# Patient Record
Sex: FEMALE | Race: OTHER | NOT HISPANIC OR LATINO | URBAN - METROPOLITAN AREA
[De-identification: names, ages, dates, MRNs, and addresses within clinical notes are randomized per-mention and may not be internally consistent; named-entity substitution may affect disease eponyms.]

---

## 2018-11-08 VITALS
HEART RATE: 74 BPM | WEIGHT: 128.09 LBS | OXYGEN SATURATION: 100 % | SYSTOLIC BLOOD PRESSURE: 145 MMHG | TEMPERATURE: 98 F | HEIGHT: 60 IN | DIASTOLIC BLOOD PRESSURE: 86 MMHG

## 2018-11-08 NOTE — H&P ADULT - HISTORY OF PRESENT ILLNESS
*Confirm Medications on Arrival    74 y/o F Current Smoker w/ PMHX HTN, HLD, COPD (per MD note, pt denies), Cardiomyopathy per MD note, who per Dr. Wylie’s note has increasing LE claudication, increasing leg pain/heaviness, claudication noted and B/L pain/discomfort. R > L, at times endorsing rest pain. She has to stop after 1 block. Her legs get heavy and tired. [place] class 4 symptoms. S/p abnormal LE arterial Doppler per MD note dated 10/8/18 revealing distal SFA, popliteal, and tibial monophasic, possible occlusion. Pt also reports LE ankle edema. Denies LE skin/hair changes, numbness/paresthesias or LE ulcers.     Due to pts risk factors, LE claudication, abnormal LE arterial doppler, pt is referred for peripheral catheterization w/ possible intervention. 74 y/o F Current Smoker w/ PMHX HTN, HLD, COPD (per MD note, pt denies), Cardiomyopathy per MD note, who per Dr. Wylie’s note has increasing LE claudication, increasing leg pain/heaviness, claudication noted and B/L pain/discomfort. R > L, at times endorsing rest pain. She has to stop after 1 block. Her legs get heavy and tired. [place] class 4 symptoms. S/p abnormal LE arterial Doppler per MD note dated 10/8/18 revealing distal SFA, popliteal, and tibial monophasic, possible occlusion. Pt also reports LE ankle edema. Denies LE skin/hair changes, numbness/paresthesias or LE ulcers.     Due to pts risk factors, LE claudication, abnormal LE arterial doppler, pt is referred for peripheral catheterization w/ possible intervention. 72 y/o F Current Smoker w/ PMHX HTN, HLD, COPD (per MD note, pt denies), Cardiomyopathy per MD note, who per Dr. Wylie’s note has increasing LE claudication, increasing leg pain/heaviness, claudication noted and B/L pain/discomfort. R > L, at times endorsing rest pain. She has to stop after 1 block. Her legs get heavy and tired. [place] class 4 symptoms. However, upon arrival to [facility] pt denies LE claudication, leg pain/heaviness, pain at rest, LE skin/hair changes, numbness/paresthesias or LE ulcers. Pt claims this is pre-op clearance for surgery for her right foot for pin insertions in her second and third toe (pt reports she is followed by her podiatrist, Dr. Jerry).  Pt also reports LE ankle edema.     LE arterial Doppler per MD note dated 10/8/18 revealing distal SFA, popliteal, and tibial monophasic, possible occlusion.    Due to pts risk factors, abnormal LE arterial doppler, pt is referred for peripheral catheterization w/ possible intervention.

## 2018-11-08 NOTE — H&P ADULT - ASSESSMENT
72 y/o F Current Smoker w/ PMHX HTN, HLD, COPD (per MD note, pt denies), Cardiomyopathy per MD note, who presents for peripheral catheterization w/ possible intervention in the setting of pts risk factors, [place] Class IV sxs and abnormal LE arterial doppler      ASA III   Mallampati III    Risks & benefits of procedure and alternative therapy have been explained to the patient including but not limited to: allergic reaction, bleeding w/possible need for blood transfusion, infection, renal and vascular compromise, limb damage, stroke, Myocardial infarction, vessel dissection/perforation, emergent Vascular Surgery. Informed consent obtained and in chart.       Precath/ Consented    Pt reports noncompliance with her daily ASA 81mg. Pt loaded with ASA 325mg PO x1 and Plavix 600mg PO x1 pre-procedure. 72 y/o F Current Smoker w/ PMHX HTN, HLD, COPD (per MD note, pt denies), Cardiomyopathy per MD note, who presents for peripheral catheterization w/ possible intervention in the setting of pts risk factors, preop for right foot surgery and abnormal LE arterial doppler.      ASA III   Mallampati III    Risks & benefits of procedure and alternative therapy have been explained to the patient including but not limited to: allergic reaction, bleeding w/possible need for blood transfusion, infection, renal and vascular compromise, limb damage, stroke, Myocardial infarction, vessel dissection/perforation, emergent Vascular Surgery. Informed consent obtained and in chart.       Precath/ Consented    Pt reports noncompliance with her daily ASA 81mg. Pt loaded with ASA 325mg PO x1 pre-procedure as d/w Dr. Wylie.  Presents as euvolemic on exam. IVF Hydration: NS 75 cc/hr.

## 2018-11-08 NOTE — H&P ADULT - REASON FOR ADMISSION
LE Claudication / Abnormal LE Doppler Abnormal LE Doppler/ preop for second and third toe surgery on her right foot (pin insertions by Dr. Jerry, not yet scheduled)

## 2018-11-09 ENCOUNTER — INPATIENT (INPATIENT)
Facility: HOSPITAL | Age: 73
LOS: 0 days | Discharge: HOME | DRG: 271 | End: 2018-11-10
Attending: INTERNAL MEDICINE | Admitting: INTERNAL MEDICINE
Payer: MEDICARE

## 2018-11-09 DIAGNOSIS — Z90.722 ACQUIRED ABSENCE OF OVARIES, BILATERAL: Chronic | ICD-10-CM

## 2018-11-09 DIAGNOSIS — Z90.89 ACQUIRED ABSENCE OF OTHER ORGANS: Chronic | ICD-10-CM

## 2018-11-09 DIAGNOSIS — Z90.710 ACQUIRED ABSENCE OF BOTH CERVIX AND UTERUS: Chronic | ICD-10-CM

## 2018-11-09 LAB
ANION GAP SERPL CALC-SCNC: 12 MMOL/L — SIGNIFICANT CHANGE UP (ref 5–17)
APTT BLD: 24.2 SEC — LOW (ref 27.5–36.3)
BASOPHILS NFR BLD AUTO: 0.3 % — SIGNIFICANT CHANGE UP (ref 0–2)
BUN SERPL-MCNC: 9 MG/DL — SIGNIFICANT CHANGE UP (ref 7–23)
CALCIUM SERPL-MCNC: 9.6 MG/DL — SIGNIFICANT CHANGE UP (ref 8.4–10.5)
CHLORIDE SERPL-SCNC: 99 MMOL/L — SIGNIFICANT CHANGE UP (ref 96–108)
CHOLEST SERPL-MCNC: 163 MG/DL — SIGNIFICANT CHANGE UP (ref 10–199)
CK MB CFR SERPL CALC: 2.1 NG/ML — SIGNIFICANT CHANGE UP (ref 0–6.7)
CO2 SERPL-SCNC: 28 MMOL/L — SIGNIFICANT CHANGE UP (ref 22–31)
CREAT SERPL-MCNC: 0.67 MG/DL — SIGNIFICANT CHANGE UP (ref 0.5–1.3)
CRP SERPL-MCNC: 0.05 MG/DL — SIGNIFICANT CHANGE UP (ref 0–0.4)
EOSINOPHIL NFR BLD AUTO: 1.1 % — SIGNIFICANT CHANGE UP (ref 0–6)
GLUCOSE SERPL-MCNC: 77 MG/DL — SIGNIFICANT CHANGE UP (ref 70–99)
HBA1C BLD-MCNC: 5.4 % — SIGNIFICANT CHANGE UP (ref 4–5.6)
HCT VFR BLD CALC: 42.1 % — SIGNIFICANT CHANGE UP (ref 34.5–45)
HDLC SERPL-MCNC: 67 MG/DL — SIGNIFICANT CHANGE UP
HGB BLD-MCNC: 13.8 G/DL — SIGNIFICANT CHANGE UP (ref 11.5–15.5)
INR BLD: 0.94 — SIGNIFICANT CHANGE UP (ref 0.88–1.16)
LIPID PNL WITH DIRECT LDL SERPL: 80 MG/DL — SIGNIFICANT CHANGE UP
LYMPHOCYTES # BLD AUTO: 33.5 % — SIGNIFICANT CHANGE UP (ref 13–44)
MCHC RBC-ENTMCNC: 29.5 PG — SIGNIFICANT CHANGE UP (ref 27–34)
MCHC RBC-ENTMCNC: 32.8 G/DL — SIGNIFICANT CHANGE UP (ref 32–36)
MCV RBC AUTO: 90 FL — SIGNIFICANT CHANGE UP (ref 80–100)
MONOCYTES NFR BLD AUTO: 6.3 % — SIGNIFICANT CHANGE UP (ref 2–14)
NEUTROPHILS NFR BLD AUTO: 58.8 % — SIGNIFICANT CHANGE UP (ref 43–77)
PLATELET # BLD AUTO: 228 K/UL — SIGNIFICANT CHANGE UP (ref 150–400)
POTASSIUM SERPL-MCNC: SIGNIFICANT CHANGE UP MMOL/L (ref 3.5–5.3)
POTASSIUM SERPL-SCNC: SIGNIFICANT CHANGE UP MMOL/L (ref 3.5–5.3)
PROTHROM AB SERPL-ACNC: 10.6 SEC — SIGNIFICANT CHANGE UP (ref 10–12.9)
RBC # BLD: 4.68 M/UL — SIGNIFICANT CHANGE UP (ref 3.8–5.2)
RBC # FLD: 14.9 % — SIGNIFICANT CHANGE UP (ref 10.3–16.9)
SODIUM SERPL-SCNC: 139 MMOL/L — SIGNIFICANT CHANGE UP (ref 135–145)
TOTAL CHOLESTEROL/HDL RATIO MEASUREMENT: 2.4 RATIO — LOW (ref 3.3–7.1)
TRIGL SERPL-MCNC: 79 MG/DL — SIGNIFICANT CHANGE UP (ref 10–149)
WBC # BLD: 7.3 K/UL — SIGNIFICANT CHANGE UP (ref 3.8–10.5)
WBC # FLD AUTO: 7.3 K/UL — SIGNIFICANT CHANGE UP (ref 3.8–10.5)

## 2018-11-09 PROCEDURE — 37225: CPT

## 2018-11-09 PROCEDURE — 75630 X-RAY AORTA LEG ARTERIES: CPT | Mod: 26,59

## 2018-11-09 RX ORDER — SODIUM CHLORIDE 9 MG/ML
500 INJECTION INTRAMUSCULAR; INTRAVENOUS; SUBCUTANEOUS
Qty: 0 | Refills: 0 | Status: DISCONTINUED | OUTPATIENT
Start: 2018-11-09 | End: 2018-11-09

## 2018-11-09 RX ORDER — ASPIRIN/CALCIUM CARB/MAGNESIUM 324 MG
81 TABLET ORAL DAILY
Qty: 0 | Refills: 0 | Status: DISCONTINUED | OUTPATIENT
Start: 2018-11-10 | End: 2018-11-10

## 2018-11-09 RX ORDER — SODIUM CHLORIDE 9 MG/ML
500 INJECTION INTRAMUSCULAR; INTRAVENOUS; SUBCUTANEOUS
Qty: 0 | Refills: 0 | Status: DISCONTINUED | OUTPATIENT
Start: 2018-11-09 | End: 2018-11-10

## 2018-11-09 RX ORDER — CHLORHEXIDINE GLUCONATE 213 G/1000ML
1 SOLUTION TOPICAL ONCE
Qty: 0 | Refills: 0 | Status: DISCONTINUED | OUTPATIENT
Start: 2018-11-09 | End: 2018-11-09

## 2018-11-09 RX ORDER — CLOPIDOGREL BISULFATE 75 MG/1
75 TABLET, FILM COATED ORAL DAILY
Qty: 0 | Refills: 0 | Status: DISCONTINUED | OUTPATIENT
Start: 2018-11-10 | End: 2018-11-10

## 2018-11-09 RX ORDER — CILOSTAZOL 100 MG/1
50 TABLET ORAL
Qty: 0 | Refills: 0 | Status: DISCONTINUED | OUTPATIENT
Start: 2018-11-09 | End: 2018-11-10

## 2018-11-09 RX ORDER — CLOPIDOGREL BISULFATE 75 MG/1
600 TABLET, FILM COATED ORAL ONCE
Qty: 0 | Refills: 0 | Status: COMPLETED | OUTPATIENT
Start: 2018-11-09 | End: 2018-11-09

## 2018-11-09 RX ORDER — ASPIRIN/CALCIUM CARB/MAGNESIUM 324 MG
325 TABLET ORAL ONCE
Qty: 0 | Refills: 0 | Status: COMPLETED | OUTPATIENT
Start: 2018-11-09 | End: 2018-11-09

## 2018-11-09 RX ORDER — AMLODIPINE BESYLATE 2.5 MG/1
5 TABLET ORAL DAILY
Qty: 0 | Refills: 0 | Status: DISCONTINUED | OUTPATIENT
Start: 2018-11-09 | End: 2018-11-10

## 2018-11-09 RX ADMIN — SODIUM CHLORIDE 75 MILLILITER(S): 9 INJECTION INTRAMUSCULAR; INTRAVENOUS; SUBCUTANEOUS at 19:56

## 2018-11-09 RX ADMIN — Medication 325 MILLIGRAM(S): at 17:23

## 2018-11-09 RX ADMIN — CLOPIDOGREL BISULFATE 600 MILLIGRAM(S): 75 TABLET, FILM COATED ORAL at 19:39

## 2018-11-10 ENCOUNTER — TRANSCRIPTION ENCOUNTER (OUTPATIENT)
Age: 73
End: 2018-11-10

## 2018-11-10 VITALS — TEMPERATURE: 98 F

## 2018-11-10 LAB
ANION GAP SERPL CALC-SCNC: 12 MMOL/L — SIGNIFICANT CHANGE UP (ref 5–17)
BUN SERPL-MCNC: 9 MG/DL — SIGNIFICANT CHANGE UP (ref 7–23)
CALCIUM SERPL-MCNC: 9.2 MG/DL — SIGNIFICANT CHANGE UP (ref 8.4–10.5)
CHLORIDE SERPL-SCNC: 100 MMOL/L — SIGNIFICANT CHANGE UP (ref 96–108)
CO2 SERPL-SCNC: 28 MMOL/L — SIGNIFICANT CHANGE UP (ref 22–31)
CREAT SERPL-MCNC: 0.81 MG/DL — SIGNIFICANT CHANGE UP (ref 0.5–1.3)
GLUCOSE SERPL-MCNC: 86 MG/DL — SIGNIFICANT CHANGE UP (ref 70–99)
HCT VFR BLD CALC: 40.2 % — SIGNIFICANT CHANGE UP (ref 34.5–45)
HGB BLD-MCNC: 13 G/DL — SIGNIFICANT CHANGE UP (ref 11.5–15.5)
MAGNESIUM SERPL-MCNC: 2 MG/DL — SIGNIFICANT CHANGE UP (ref 1.6–2.6)
MCHC RBC-ENTMCNC: 29.3 PG — SIGNIFICANT CHANGE UP (ref 27–34)
MCHC RBC-ENTMCNC: 32.3 G/DL — SIGNIFICANT CHANGE UP (ref 32–36)
MCV RBC AUTO: 90.5 FL — SIGNIFICANT CHANGE UP (ref 80–100)
PLATELET # BLD AUTO: 215 K/UL — SIGNIFICANT CHANGE UP (ref 150–400)
POTASSIUM SERPL-MCNC: 4 MMOL/L — SIGNIFICANT CHANGE UP (ref 3.5–5.3)
POTASSIUM SERPL-SCNC: 4 MMOL/L — SIGNIFICANT CHANGE UP (ref 3.5–5.3)
RBC # BLD: 4.44 M/UL — SIGNIFICANT CHANGE UP (ref 3.8–5.2)
RBC # FLD: 14.9 % — SIGNIFICANT CHANGE UP (ref 10.3–16.9)
SODIUM SERPL-SCNC: 140 MMOL/L — SIGNIFICANT CHANGE UP (ref 135–145)
WBC # BLD: 6 K/UL — SIGNIFICANT CHANGE UP (ref 3.8–10.5)
WBC # FLD AUTO: 6 K/UL — SIGNIFICANT CHANGE UP (ref 3.8–10.5)

## 2018-11-10 PROCEDURE — 99231 SBSQ HOSP IP/OBS SF/LOW 25: CPT | Mod: 25

## 2018-11-10 RX ORDER — CLOPIDOGREL BISULFATE 75 MG/1
1 TABLET, FILM COATED ORAL
Qty: 30 | Refills: 5
Start: 2018-11-10 | End: 2019-05-08

## 2018-11-10 RX ORDER — ATORVASTATIN CALCIUM 80 MG/1
40 TABLET, FILM COATED ORAL AT BEDTIME
Qty: 0 | Refills: 0 | Status: DISCONTINUED | OUTPATIENT
Start: 2018-11-10 | End: 2018-11-10

## 2018-11-10 RX ADMIN — CILOSTAZOL 50 MILLIGRAM(S): 100 TABLET ORAL at 06:31

## 2018-11-10 RX ADMIN — Medication 81 MILLIGRAM(S): at 11:15

## 2018-11-10 RX ADMIN — CLOPIDOGREL BISULFATE 75 MILLIGRAM(S): 75 TABLET, FILM COATED ORAL at 11:15

## 2018-11-10 RX ADMIN — AMLODIPINE BESYLATE 5 MILLIGRAM(S): 2.5 TABLET ORAL at 06:31

## 2018-11-10 NOTE — PROGRESS NOTE ADULT - SUBJECTIVE AND OBJECTIVE BOX
Pt is being evaluated for PVD   /O /A foot     PAST MEDICAL & SURGICAL HISTORY:  Endometriosis  History of tonsillectomy  S/P hysterectomy  H/O bilateral oophorectomy    MEDICATIONS  (STANDING):  amLODIPine   Tablet 5 milliGRAM(s) Oral daily  aspirin enteric coated 81 milliGRAM(s) Oral daily  atorvastatin 40 milliGRAM(s) Oral at bedtime  cilostazol 50 milliGRAM(s) Oral two times a day  clopidogrel Tablet 75 milliGRAM(s) Oral daily  sodium chloride 0.9%. 500 milliLiter(s) (75 mL/Hr) IV Continuous <Continuous>    MEDICATIONS  (PRN):    ICU Vital Signs Last 24 Hrs  T(C): 36.6 (10 Nov 2018 09:27), Max: 37 (10 Nov 2018 07:00)  T(F): 97.8 (10 Nov 2018 09:27), Max: 98.6 (10 Nov 2018 07:00)  HR: 82 (10 Nov 2018 08:19) (65 - 82)  BP: 142/64 (10 Nov 2018 08:19) (141/63 - 149/67)  BP(mean): --  ABP: --  ABP(mean): --  RR: 17 (10 Nov 2018 08:19) (16 - 18)  SpO2: 98% (10 Nov 2018 08:19) (96% - 98%)      lungs clear  Cv s1 s2  Abd soft  Ext stable                          13.0   6.0   )-----------( 215      ( 10 Nov 2018 06:58 )             40.2   11-10    140  |  100  |  9   ----------------------------<  86  4.0   |  28  |  0.81    Ca    9.2      10 Nov 2018 06:58  Mg     2.0     11-10    abnormal doppler

## 2018-11-10 NOTE — DISCHARGE NOTE ADULT - MEDICATION SUMMARY - MEDICATIONS TO TAKE
I will START or STAY ON the medications listed below when I get home from the hospital:    aspirin 81 mg oral tablet  -- 1 tab(s) by mouth once a day  -- Indication: For Peripheral arterial disease     clopidogrel 75 mg oral tablet  -- 1 tab(s) by mouth once a day  ** NEW  ** also known as Plavix   -- Indication: For Peripheral arterial disease     amLODIPine 5 mg oral tablet  -- 1 tab(s) by mouth once a day  -- Indication: For Hypertension     cilostazol 50 mg oral tablet  -- 1 tab(s) by mouth 2 times a day  -- Indication: For Peripheral arterial disease

## 2018-11-10 NOTE — DISCHARGE NOTE ADULT - CARE PROVIDER_API CALL
Valentino Wylie), Cardiovascular Disease; Interventional Cardiology; Nuclear Cardiology  [address] [place] [address]  Phone: [phone]  Fax: [phone]

## 2018-11-10 NOTE — DISCHARGE NOTE ADULT - PLAN OF CARE
to be leg pain free You had peripheral cath procedure to your Right SFA.  You need to take Aspirin and Clopidogrel (aka Plavix) daily -- prescription for Clopidogrel is sent to [facility].  Future refill need to be done by Dr. Wylie.  You have 5 refill with this prescription from the hospital.  Follow up with Dr. Wylie in 1-2 weeks.   - Wound care instruction: Avoid strenuous activities such as lifting, running, pushing or sex for 1 week in order to avoid bleeding complications in the groin.  If any questions about the groin, call us at [phone].  Ok to shower tonight. Avoid bathing for 1 week Continue Amlodipine.  Low salt diet please.

## 2018-11-10 NOTE — DISCHARGE NOTE ADULT - REASON FOR ADMISSION
Abnormal LE Doppler/ preop for second and third toe surgery on her right foot (pin insertions by Dr. Jerry, not yet scheduled)

## 2018-11-10 NOTE — PROGRESS NOTE ADULT - REASON FOR ADMISSION
PCI
Abnormal LE Doppler/ preop for second and third toe surgery on her right foot (pin insertions by Dr. Jerry, not yet scheduled)

## 2018-11-10 NOTE — DISCHARGE NOTE ADULT - CARE PLAN
Principal Discharge DX:	Peripheral arterial disease  Goal:	to be leg pain free  Assessment and plan of treatment:	You had peripheral cath procedure to your Right SFA.  You need to take Aspirin and Clopidogrel (aka Plavix) daily -- prescription for Clopidogrel is sent to [facility].  Future refill need to be done by Dr. Wylie.  You have 5 refill with this prescription from the hospital.  Follow up with Dr. Wylie in 1-2 weeks.   - Wound care instruction: Avoid strenuous activities such as lifting, running, pushing or sex for 1 week in order to avoid bleeding complications in the groin.  If any questions about the groin, call us at [phone].  Ok to shower tonight. Avoid bathing for 1 week  Secondary Diagnosis:	HTN (hypertension), benign  Assessment and plan of treatment:	Continue Amlodipine.  Low salt diet please.

## 2018-11-10 NOTE — DISCHARGE NOTE ADULT - HOSPITAL COURSE
74 y/o F Current Smoker w/ PMHX HTN, HLD, COPD (per MD note, pt denies), Cardiomyopathy per MD note, who per Dr. Wylie’s note has increasing LE claudication, increasing leg pain/heaviness, claudication noted and B/L pain/discomfort. R > L, at times endorsing rest pain. She has to stop after 1 block. Her legs get heavy and tired. [place] class 4 symptoms. However, upon arrival to [facility] pt denies LE claudication, leg pain/heaviness, pain at rest, LE skin/hair changes, numbness/paresthesias or LE ulcers. Pt claims this is pre-op clearance for surgery for her right foot for pin insertions in her second and third toe (pt reports she is followed by her podiatrist, Dr. Jerry).  Pt also reports LE ankle edema. 74 y/o F Current Smoker w/ PMHX HTN, HLD, COPD (per MD note, pt denies), Cardiomyopathy per MD note, who per Dr. Wylie’s note has increasing LE claudication, increasing leg pain/heaviness, claudication noted and B/L pain/discomfort. R > L, at times endorsing rest pain. She has to stop after 1 block. Her legs get heavy and tired. [place] class 4 symptoms. However, upon arrival to [facility] pt denies LE claudication, leg pain/heaviness, pain at rest, LE skin/hair changes, numbness/paresthesias or LE ulcers. Pt claims this is pre-op clearance for surgery for her right foot for pin insertions in her second and third toe (pt reports she is followed by her podiatrist, Dr. Jerry).  Pt also reports LE ankle edema.   s/p elective peripheral cath 11/9 with orbital atherectomy / DCB to right SFA.  No complaints today; no groin pain / leg pain / chest pain / SOB.  Stable for discharge.   VS: 146/67. HR 73.  RR 16. T 98.6 F  Gen: NAD  Pulm: CTA bilateral, no wheeze or rale  CVS: S1/S2 normal, RRR, no murmur  Abd: + BS, soft, NT/ND  Ext: No LE edema. Left groin wound without hematoma or bleeding. Left pedal pulse 2+. Right pedal pulse 1+   Neuro: AAO x 3

## 2018-11-10 NOTE — DISCHARGE NOTE ADULT - PATIENT PORTAL LINK FT
You can access the [facility] Patient Portal, offered by [facility], by registering with the following website: [email or web address]

## 2018-11-16 DIAGNOSIS — E78.5 HYPERLIPIDEMIA, UNSPECIFIED: ICD-10-CM

## 2018-11-16 DIAGNOSIS — F17.210 NICOTINE DEPENDENCE, CIGARETTES, UNCOMPLICATED: ICD-10-CM

## 2018-11-16 DIAGNOSIS — I42.9 CARDIOMYOPATHY, UNSPECIFIED: ICD-10-CM

## 2018-11-16 DIAGNOSIS — I73.9 PERIPHERAL VASCULAR DISEASE, UNSPECIFIED: ICD-10-CM

## 2018-11-16 DIAGNOSIS — J44.9 CHRONIC OBSTRUCTIVE PULMONARY DISEASE, UNSPECIFIED: ICD-10-CM

## 2018-11-16 DIAGNOSIS — I10 ESSENTIAL (PRIMARY) HYPERTENSION: ICD-10-CM

## 2018-11-24 PROCEDURE — 86140 C-REACTIVE PROTEIN: CPT

## 2018-11-24 PROCEDURE — C1769: CPT

## 2018-11-24 PROCEDURE — 82553 CREATINE MB FRACTION: CPT

## 2018-11-24 PROCEDURE — 83036 HEMOGLOBIN GLYCOSYLATED A1C: CPT

## 2018-11-24 PROCEDURE — C1714: CPT

## 2018-11-24 PROCEDURE — C1724: CPT

## 2018-11-24 PROCEDURE — 85025 COMPLETE CBC W/AUTO DIFF WBC: CPT

## 2018-11-24 PROCEDURE — C1887: CPT

## 2018-11-24 PROCEDURE — 85730 THROMBOPLASTIN TIME PARTIAL: CPT

## 2018-11-24 PROCEDURE — 82550 ASSAY OF CK (CPK): CPT

## 2018-11-24 PROCEDURE — 80048 BASIC METABOLIC PNL TOTAL CA: CPT

## 2018-11-24 PROCEDURE — 85027 COMPLETE CBC AUTOMATED: CPT

## 2018-11-24 PROCEDURE — C2623: CPT

## 2018-11-24 PROCEDURE — 85610 PROTHROMBIN TIME: CPT

## 2018-11-24 PROCEDURE — 83735 ASSAY OF MAGNESIUM: CPT

## 2018-11-24 PROCEDURE — C1894: CPT

## 2018-11-24 PROCEDURE — C1760: CPT

## 2018-11-24 PROCEDURE — 36415 COLL VENOUS BLD VENIPUNCTURE: CPT

## 2018-11-24 PROCEDURE — 80061 LIPID PANEL: CPT

## 2018-11-30 NOTE — PATIENT PROFILE ADULT - NSASFALLNEEDSASSISTWITH_GEN_A_NUR
Patient called stating she's had a migraine that wont go away. Per Dr. Traylor, will give Compazine 5mg TID for two days and 12 day course of prednisone. Also, she is due to have Aimovig shipped to her home in the next week or so.            Electronically signed by:Catie Henriquez CMA  Oct 10 2018  2:42PM CST    
toileting/walking/standing

## 2019-04-09 NOTE — H&P ADULT - BP NONINVASIVE MEAN (MM HG)
Notification Instructions: Patient will be notified of biopsy results. However, patient instructed to call the office if not contacted within 2 weeks. Was A Bandage Applied: Yes Hemostasis: Electrocautery and Aluminum Chloride Anticipated Plan (Based On Presumed Biopsy Results): EDC Silver Nitrate Text: The wound bed was treated with silver nitrate after the biopsy was performed. Dressing: bandage Anesthesia Volume In Cc (Will Not Render If 0): 0.5 Post-Care Instructions: I reviewed with the patient in detail post-care instructions. Patient is to keep the biopsy site dry overnight, and then apply bacitracin twice daily until healed. Patient may apply hydrogen peroxide soaks to remove any crusting. Type Of Destruction Used: Curettage Electrodesiccation And Curettage Text: The wound bed was treated with electrodesiccation and curettage after the biopsy was performed. Billing Type: Third-Party Bill X Size Of Lesion In Cm: 0 Biopsy Type: H and E Electrodesiccation Text: The wound bed was treated with electrodesiccation after the biopsy was performed. Bill [id number] For Specimen Handling/Conveyance To Laboratory?: no Depth Of Biopsy: dermis Wound Care: Petrolatum Anesthesia Type: 1% lidocaine with epinephrine Cryotherapy Text: The wound bed was treated with cryotherapy after the biopsy was performed. Lab Facility: [id number] Size Of Lesion In Cm: 1 Biopsy Method: double edge Personna blade Lab: 6 Consent: Written consent was obtained and risks were reviewed including but not limited to scarring, infection, bleeding, scabbing, incomplete removal, nerve damage and allergy to anesthesia. Curettage Text: The wound bed was treated with curettage after the biopsy was performed. Detail Level: Detailed

## 2019-10-07 NOTE — PATIENT PROFILE ADULT - NSPROCHRONICPAIN_GEN_A_NUR
no Full Thickness Lip Wedge Repair (Flap) Text: Given the location of the defect and the proximity to free margins a full thickness wedge repair was deemed most appropriate.  Using a sterile surgical marker, the appropriate repair was drawn incorporating the defect and placing the expected incisions perpendicular to the vermilion border.  The vermilion border was also meticulously outlined to ensure appropriate reapproximation during the repair.  The area thus outlined was incised through and through with a #15 scalpel blade.  The muscularis and dermis were reaproximated with deep sutures following hemostasis. Care was taken to realign the vermilion border before proceeding with the superficial closure.  Once the vermilion was realigned the superfical and mucosal closure was finished.

## 2023-01-31 PROBLEM — N80.9 ENDOMETRIOSIS, UNSPECIFIED: Chronic | Status: ACTIVE | Noted: 2018-11-08

## 2023-01-31 PROBLEM — Z00.00 ENCOUNTER FOR PREVENTIVE HEALTH EXAMINATION: Status: ACTIVE | Noted: 2023-01-31

## 2023-02-27 ENCOUNTER — APPOINTMENT (OUTPATIENT)
Dept: OTOLARYNGOLOGY | Facility: CLINIC | Age: 78
End: 2023-02-27
Payer: MEDICARE

## 2023-02-27 VITALS
BODY MASS INDEX: 29.84 KG/M2 | DIASTOLIC BLOOD PRESSURE: 63 MMHG | HEIGHT: 60 IN | OXYGEN SATURATION: 95 % | SYSTOLIC BLOOD PRESSURE: 112 MMHG | HEART RATE: 82 BPM | WEIGHT: 152 LBS | TEMPERATURE: 98.1 F

## 2023-02-27 DIAGNOSIS — Z86.79 PERSONAL HISTORY OF OTHER DISEASES OF THE CIRCULATORY SYSTEM: ICD-10-CM

## 2023-02-27 DIAGNOSIS — Z78.9 OTHER SPECIFIED HEALTH STATUS: ICD-10-CM

## 2023-02-27 DIAGNOSIS — Z82.49 FAMILY HISTORY OF ISCHEMIC HEART DISEASE AND OTHER DISEASES OF THE CIRCULATORY SYSTEM: ICD-10-CM

## 2023-02-27 DIAGNOSIS — Z87.891 PERSONAL HISTORY OF NICOTINE DEPENDENCE: ICD-10-CM

## 2023-02-27 PROCEDURE — 31579 LARYNGOSCOPY TELESCOPIC: CPT

## 2023-02-27 PROCEDURE — 99205 OFFICE O/P NEW HI 60 MIN: CPT | Mod: 25

## 2023-02-27 RX ORDER — AMLODIPINE BESYLATE 5 MG/1
TABLET ORAL
Refills: 0 | Status: ACTIVE | COMMUNITY

## 2023-02-27 RX ORDER — OLMESARTAN MEDOXOMIL 5 MG/1
5 TABLET, FILM COATED ORAL
Refills: 0 | Status: ACTIVE | COMMUNITY

## 2023-02-27 RX ORDER — CHROMIUM 200 MCG
TABLET ORAL
Refills: 0 | Status: ACTIVE | COMMUNITY

## 2023-02-27 RX ORDER — ASPIRIN 81 MG
81 TABLET, DELAYED RELEASE (ENTERIC COATED) ORAL
Refills: 0 | Status: ACTIVE | COMMUNITY

## 2023-02-27 RX ORDER — ASPIRIN 325 MG/1
325 TABLET, FILM COATED ORAL
Refills: 0 | Status: ACTIVE | COMMUNITY

## 2023-02-27 RX ORDER — MOMETASONE FUROATE 1 MG/ML
0.1 SOLUTION TOPICAL
Qty: 1 | Refills: 1 | Status: ACTIVE | COMMUNITY
Start: 2023-02-27 | End: 1900-01-01

## 2023-02-27 NOTE — PROCEDURE
[de-identified] : -\par Procedure: Flexible Laryngoscopy with Stroboscopy\par \par Pre-operative Diagnosis: dysphonia \par Post-operative Diagnosis: right vocal fold lesion anterior, plaque like\par Anesthesia: Topical - 1% Lidocaine/Phenylephrine \par \par Procedure Details: \par The patient was placed in the sitting position. After decongestant and anesthesia were applied the laryngoscope was passed. The nasal cavities, nasopharynx, oropharynx, hypopharynx, and larynx were all examined. Vocal folds were examined during respiration and phonation. The following findings were noted:\par \par Findings: \par Nose: Septum is midline, turbinates are normal, nasal airways patent, mucosa normal\par Nasopharynx: Adenoids normal, no masses, eustachian tube normal\par Oropharynx: Pharyngeal walls symmetric and without lesion. Tonsils/fossae symmetric\par Hypopharynx: Hypopharynx and pyriform sinuses without lesion. No masses or asymmetry. No pooling of secretions.\par Larynx: Epiglottis and aryepiglottic folds were sharp and crisp bilaterally. Bilateral false vocal folds normal appearance. Airway was widely patent.\par \par Strobe Exam Ratings\par 		\par TVF Appearance: right vocal fold lesion that is plaque like-white \par TVF Mobility: mobile bilaterally \par Edema/hypertrophy: none \par Mucus on TVF: none \par Glottic Closure: glottic gap \par Mucosal Wave: reduced\par Amplitude of Vibration: reduced\par Phase: asymmetric \par Supraglottic Hyperfunction: + \par Other Findings:\par \par Condition: Stable. Patient tolerated procedure well.\par \par Complications: None\par \par

## 2023-02-27 NOTE — PHYSICAL EXAM
[Midline] : trachea located in midline position [Normal] : no rashes [FreeTextEntry1] : Slightly hoarse, good projection, range and pitch control

## 2023-02-27 NOTE — ASSESSMENT
[FreeTextEntry1] : 77 year old female presents with concern for dysphonia. On videostroboscopy, there is evidence of a white, patchy lesion of the right true vocal fold with subsequent glottic gap and impaired vibration. Based on her history and exam findings, I am recommending surgical intervention including right vocal fold lesion excisional biopsy with KTP laser ablation. \par \par Risk, benefits and alternatives of surgery were discussed including bleeding, infection, pain, risk of anesthesia, problems chewing or swallowing, changed or worsened voice, chipped teeth, tongue numbness, taste disturbance, referred ear pain, need for further procedures and possible time off of work. Pre op testing and COVID testing pending.\par \par - pre op clearance\par - pre op visit\par - COVID testing \par

## 2023-02-27 NOTE — HISTORY OF PRESENT ILLNESS
[de-identified] : 2/27/23\par 77F presents with dysphonia. Pt unsure of when symptoms started. PCP told pt that they had noted a change in her voice. She has not noted any changes. Denies issues chewing, eating or swallowing. + dyspnea on exertion for about 3 years. She follows up with pulmonologist, doesn't recall having PFT's done.  Voice demands include conversations throughout the day w/ friends, family. Nothing significant. No hx of acid reflux. Former smoker 1/2-1PPD x 20 years, quit 3 years ago. \par \par Diet:\par \par + 1 cup caffeine, chocolate, tomato, garlic, onion, carbonated beverages, spicy foods\par - mint, citrus, blueberries \par Glasses of water per day: 5 glasses \par Late night eating: no\par Alcohol use, especially at night: no\par \par

## 2023-04-10 ENCOUNTER — APPOINTMENT (OUTPATIENT)
Dept: OTOLARYNGOLOGY | Facility: CLINIC | Age: 78
End: 2023-04-10
Payer: MEDICARE

## 2023-04-10 DIAGNOSIS — L29.9 PRURITUS, UNSPECIFIED: ICD-10-CM

## 2023-04-10 NOTE — ASU PATIENT PROFILE, ADULT - NS PREOP UNDERSTANDS INFO
As per Patient, MD's office instructed to arrive at 05:00am. No solid food/dairy/candy/gum after 9:30pm tonight, water before 04:30am, patient reminded to come with photo ID/insurance, dress in comfortable clothes, no jewelries/contact lens/valuables, no smoking/alcohol drinking/recreational drug use today; escort to come with photo ID; address and callback number was given../yes

## 2023-04-10 NOTE — ASU PATIENT PROFILE, ADULT - NSICDXPASTSURGICALHX_GEN_ALL_CORE_FT
PAST SURGICAL HISTORY:  H/O angioplasty stent in head    H/O bilateral oophorectomy     History of appendectomy     History of tonsillectomy     S/P hysterectomy

## 2023-04-10 NOTE — ASU PATIENT PROFILE, ADULT - FALL HARM RISK - HARM RISK INTERVENTIONS

## 2023-04-10 NOTE — ASU PATIENT PROFILE, ADULT - NS PRO AD PATIENT TYPE
Catherine Alves (Niece) Catherine Alves ([facility]) [phone]/Health Care Proxy (HCP)

## 2023-04-10 NOTE — ASU PATIENT PROFILE, ADULT - ABLE TO REACH PT
NO- Patient's VM is full. Phone # to call: [phone]; Phone # [phone] in not working./no yes

## 2023-04-11 ENCOUNTER — TRANSCRIPTION ENCOUNTER (OUTPATIENT)
Age: 78
End: 2023-04-11

## 2023-04-11 ENCOUNTER — APPOINTMENT (OUTPATIENT)
Dept: OTOLARYNGOLOGY | Facility: CLINIC | Age: 78
End: 2023-04-11
Payer: MEDICARE

## 2023-04-11 VITALS
DIASTOLIC BLOOD PRESSURE: 70 MMHG | BODY MASS INDEX: 29.84 KG/M2 | SYSTOLIC BLOOD PRESSURE: 118 MMHG | HEART RATE: 78 BPM | HEIGHT: 60 IN | WEIGHT: 152 LBS | OXYGEN SATURATION: 98 %

## 2023-04-11 DIAGNOSIS — G89.18 OTHER ACUTE POSTPROCEDURAL PAIN: ICD-10-CM

## 2023-04-11 PROCEDURE — 99215 OFFICE O/P EST HI 40 MIN: CPT | Mod: 25

## 2023-04-11 PROCEDURE — 31579 LARYNGOSCOPY TELESCOPIC: CPT

## 2023-04-11 RX ORDER — OMEPRAZOLE 40 MG/1
40 CAPSULE, DELAYED RELEASE ORAL
Qty: 30 | Refills: 0 | Status: ACTIVE | COMMUNITY
Start: 2023-04-11 | End: 1900-01-01

## 2023-04-11 RX ORDER — OXYCODONE AND ACETAMINOPHEN 5; 325 MG/1; MG/1
5-325 TABLET ORAL
Qty: 18 | Refills: 0 | Status: ACTIVE | COMMUNITY
Start: 2023-04-11 | End: 1900-01-01

## 2023-04-11 NOTE — PROCEDURE
[de-identified] : -\par Procedure: Flexible Laryngoscopy with Stroboscopy\par \par Pre-operative Diagnosis: dysphonia \par Post-operative Diagnosis: right vocal fold lesion anterior, plaque like\par Anesthesia: Topical - 1% Lidocaine/Phenylephrine \par \par Procedure Details: \par The patient was placed in the sitting position. After decongestant and anesthesia were applied the laryngoscope was passed. The nasal cavities, nasopharynx, oropharynx, hypopharynx, and larynx were all examined. Vocal folds were examined during respiration and phonation. The following findings were noted:\par \par Findings: \par Nose: Septum is midline, turbinates are normal, nasal airways patent, mucosa normal\par Nasopharynx: Adenoids normal, no masses, eustachian tube normal\par Oropharynx: Pharyngeal walls symmetric and without lesion. Tonsils/fossae symmetric\par Hypopharynx: Hypopharynx and pyriform sinuses without lesion. No masses or asymmetry. No pooling of secretions.\par Larynx: Epiglottis and aryepiglottic folds were sharp and crisp bilaterally. Bilateral false vocal folds normal appearance. Airway was widely patent.\par \par Strobe Exam Ratings\par 		\par TVF Appearance: right vocal fold lesion that is plaque like-white \par TVF Mobility: mobile bilaterally \par Edema/hypertrophy: none \par Mucus on TVF: none \par Glottic Closure: glottic gap \par Mucosal Wave: reduced\par Amplitude of Vibration: reduced\par Phase: asymmetric \par Supraglottic Hyperfunction: + \par Other Findings:\par \par Condition: Stable. Patient tolerated procedure well.\par \par Complications: None\par

## 2023-04-11 NOTE — ASSESSMENT
[FreeTextEntry1] : 77 year old female presents with concern for dysphonia. On videostroboscopy, there is evidence of a white, patchy lesion of the right true vocal fold with subsequent glottic gap and impaired vibration. Based on her history and exam findings, I am recommending surgical intervention including right vocal fold lesion excisional biopsy with KTP laser ablation. \par \par Risk, benefits and alternatives of surgery were discussed including bleeding, infection, pain, risk of anesthesia, problems chewing or swallowing, changed or worsened voice, chipped teeth, tongue numbness, taste disturbance, referred ear pain, need for further procedures and possible time off of work. Pre op testing and COVID testing pending.\par \par Patient presents for pre-op visit. Exam unchanged from previous. Will plan for  right vocal fold lesion excisional biopsy with KTP laser ablation as scheduled. \par \par – Plan for OR for suspension [employer] laryngoscopy with excisional biopsy of right vocal fold lesion and KTP laser ablation \par \par

## 2023-04-11 NOTE — HISTORY OF PRESENT ILLNESS
[de-identified] : 2/27/23\par 77F presents with dysphonia. Pt unsure of when symptoms started. PCP told pt that they had noted a change in her voice. She has not noted any changes. Denies issues chewing, eating or swallowing. + dyspnea on exertion for about 3 years. She follows up with pulmonologist, doesn't recall having PFT's done. Voice demands include conversations throughout the day w/ friends, family. Nothing significant. No hx of acid reflux. Former smoker 1/2-1PPD x 20 years, quit 3 years ago. \par \par Diet:\par \par + 1 cup caffeine, chocolate, tomato, garlic, onion, carbonated beverages, spicy foods\par - mint, citrus, blueberries \par Glasses of water per day: 5 glasses \par Late night eating: no\par Alcohol use, especially at night: no\par - [FreeTextEntry1] : 4/10/23\par No change in symptoms, no new ear nose and throat symptoms.  Patient presents for preoperative evaluation.

## 2023-04-12 ENCOUNTER — TRANSCRIPTION ENCOUNTER (OUTPATIENT)
Age: 78
End: 2023-04-12

## 2023-04-12 ENCOUNTER — RESULT REVIEW (OUTPATIENT)
Age: 78
End: 2023-04-12

## 2023-04-12 ENCOUNTER — APPOINTMENT (OUTPATIENT)
Dept: OTOLARYNGOLOGY | Facility: AMBULATORY SURGERY CENTER | Age: 78
End: 2023-04-12

## 2023-04-12 ENCOUNTER — OUTPATIENT (OUTPATIENT)
Dept: OUTPATIENT SERVICES | Facility: HOSPITAL | Age: 78
LOS: 1 days | Discharge: HOME | End: 2023-04-12
Payer: MEDICARE

## 2023-04-12 VITALS
SYSTOLIC BLOOD PRESSURE: 154 MMHG | HEART RATE: 75 BPM | OXYGEN SATURATION: 97 % | TEMPERATURE: 98 F | DIASTOLIC BLOOD PRESSURE: 70 MMHG | RESPIRATION RATE: 16 BRPM | HEIGHT: 60 IN | WEIGHT: 143.3 LBS

## 2023-04-12 VITALS
TEMPERATURE: 98 F | HEART RATE: 63 BPM | SYSTOLIC BLOOD PRESSURE: 171 MMHG | OXYGEN SATURATION: 95 % | DIASTOLIC BLOOD PRESSURE: 89 MMHG

## 2023-04-12 DIAGNOSIS — Z90.49 ACQUIRED ABSENCE OF OTHER SPECIFIED PARTS OF DIGESTIVE TRACT: Chronic | ICD-10-CM

## 2023-04-12 DIAGNOSIS — Z90.722 ACQUIRED ABSENCE OF OVARIES, BILATERAL: Chronic | ICD-10-CM

## 2023-04-12 DIAGNOSIS — Z98.62 PERIPHERAL VASCULAR ANGIOPLASTY STATUS: Chronic | ICD-10-CM

## 2023-04-12 DIAGNOSIS — Z90.710 ACQUIRED ABSENCE OF BOTH CERVIX AND UTERUS: Chronic | ICD-10-CM

## 2023-04-12 DIAGNOSIS — Z90.89 ACQUIRED ABSENCE OF OTHER ORGANS: Chronic | ICD-10-CM

## 2023-04-12 LAB — GLUCOSE BLDC GLUCOMTR-MCNC: 94 MG/DL — SIGNIFICANT CHANGE UP (ref 70–99)

## 2023-04-12 PROCEDURE — 31541 LARYNSCOP W/TUMR EXC + SCOPE: CPT

## 2023-04-12 PROCEDURE — 88360 TUMOR IMMUNOHISTOCHEM/MANUAL: CPT | Mod: 26

## 2023-04-12 PROCEDURE — 88305 TISSUE EXAM BY PATHOLOGIST: CPT | Mod: 26

## 2023-04-12 PROCEDURE — 31571 LARYNGOSCOP W/VC INJ + SCOPE: CPT

## 2023-04-12 RX ORDER — SODIUM CHLORIDE 9 MG/ML
1000 INJECTION, SOLUTION INTRAVENOUS
Refills: 0 | Status: DISCONTINUED | OUTPATIENT
Start: 2023-04-12 | End: 2023-04-12

## 2023-04-12 RX ORDER — AMLODIPINE BESYLATE 2.5 MG/1
1 TABLET ORAL
Qty: 0 | Refills: 0 | DISCHARGE

## 2023-04-12 RX ORDER — FENTANYL CITRATE 50 UG/ML
25 INJECTION INTRAVENOUS
Refills: 0 | Status: DISCONTINUED | OUTPATIENT
Start: 2023-04-12 | End: 2023-04-12

## 2023-04-12 RX ORDER — CILOSTAZOL 100 MG/1
1 TABLET ORAL
Qty: 0 | Refills: 0 | DISCHARGE

## 2023-04-12 RX ORDER — METFORMIN HYDROCHLORIDE 850 MG/1
1 TABLET ORAL
Refills: 0 | DISCHARGE

## 2023-04-12 RX ORDER — ASPIRIN/CALCIUM CARB/MAGNESIUM 324 MG
1 TABLET ORAL
Qty: 0 | Refills: 0 | DISCHARGE

## 2023-04-12 RX ORDER — APREPITANT 80 MG/1
40 CAPSULE ORAL ONCE
Refills: 0 | Status: COMPLETED | OUTPATIENT
Start: 2023-04-12 | End: 2023-04-12

## 2023-04-12 RX ORDER — OLMESARTAN MEDOXOMIL 5 MG/1
1 TABLET, FILM COATED ORAL
Refills: 0 | DISCHARGE

## 2023-04-12 RX ORDER — ACETAMINOPHEN 500 MG
1000 TABLET ORAL ONCE
Refills: 0 | Status: COMPLETED | OUTPATIENT
Start: 2023-04-12 | End: 2023-04-12

## 2023-04-12 RX ORDER — ONDANSETRON 8 MG/1
4 TABLET, FILM COATED ORAL ONCE
Refills: 0 | Status: DISCONTINUED | OUTPATIENT
Start: 2023-04-12 | End: 2023-04-12

## 2023-04-12 RX ADMIN — APREPITANT 40 MILLIGRAM(S): 80 CAPSULE ORAL at 06:50

## 2023-04-12 RX ADMIN — Medication 1000 MILLIGRAM(S): at 06:50

## 2023-04-12 NOTE — BRIEF OPERATIVE NOTE - NSICDXBRIEFPROCEDURE_GEN_ALL_CORE_FT
PROCEDURES:  Microlaryngoscopy, direct 12-Apr-2023 09:04:38  Jarvis Ivey  Endoscopic excisional biopsy of right vocal cord 12-Apr-2023 09:05:04  Jarvis Ivey  Microlaryngoscopy, with KTP laser procedure 12-Apr-2023 09:05:18  Jarvis Ivey

## 2023-04-12 NOTE — PRE-ANESTHESIA EVALUATION ADULT - NSANTHOSAYNRD_GEN_A_CORE
No. EVELYN screening performed.  STOP BANG Legend: 0-2 = LOW Risk; 3-4 = INTERMEDIATE Risk; 5-8 = HIGH Risk

## 2023-04-12 NOTE — ASU DISCHARGE PLAN (ADULT/PEDIATRIC) - ASU DC SPECIAL INSTRUCTIONSFT
Follow up with Dr. Kelley as scheduled. Absolute voice rest for 3 days; follow instructions as given to you on the pamphlet in Dr. Kelley's office.

## 2023-04-17 LAB — SURGICAL PATHOLOGY STUDY: SIGNIFICANT CHANGE UP

## 2023-04-20 ENCOUNTER — APPOINTMENT (OUTPATIENT)
Dept: OTOLARYNGOLOGY | Facility: CLINIC | Age: 78
End: 2023-04-20
Payer: MEDICARE

## 2023-04-20 VITALS
HEART RATE: 77 BPM | DIASTOLIC BLOOD PRESSURE: 78 MMHG | SYSTOLIC BLOOD PRESSURE: 162 MMHG | HEIGHT: 60 IN | TEMPERATURE: 98 F | BODY MASS INDEX: 29.84 KG/M2 | OXYGEN SATURATION: 98 % | WEIGHT: 152 LBS

## 2023-04-20 PROBLEM — Z86.79 PERSONAL HISTORY OF OTHER DISEASES OF THE CIRCULATORY SYSTEM: Chronic | Status: ACTIVE | Noted: 2023-04-11

## 2023-04-20 PROBLEM — I10 ESSENTIAL (PRIMARY) HYPERTENSION: Chronic | Status: ACTIVE | Noted: 2023-04-11

## 2023-04-20 PROCEDURE — 31579 LARYNGOSCOPY TELESCOPIC: CPT

## 2023-04-20 PROCEDURE — 99024 POSTOP FOLLOW-UP VISIT: CPT

## 2023-04-20 NOTE — ASSESSMENT
[FreeTextEntry1] : 77 year old female presents with concern for dysphonia. On videostroboscopy, there is evidence of a white, patchy lesion of the right true vocal fold with subsequent glottic gap and impaired vibration. Based on her history and exam findings, I am recommending surgical intervention including right vocal fold lesion excisional biopsy with KTP laser ablation. \par \par 4/20/23: Patient is now POD #8 s/p suspension MicroDirect laryngoscopy with excision of right vocal fold lesion.  Overall stable and well.  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  Patient is otherwise asymptomatic.  At this time I am recommending continued voice hygiene and voice rest when able.  Patient to follow-up in 6 weeks for repeat evaluation and then subsequently every 2 months for surveillance.\par \par –Increase hydration\par – Voice hygiene\par – Follow-up 6 weeks for postop\par – Follow-up every 2 months for surveillance

## 2023-04-20 NOTE — PROCEDURE
[de-identified] : -\par Procedure Note\par \par Pre-operative Diagnosis:  Postop\par Post-operative Diagnosis: postoperative change to the right vocal fold with well-healing eschar\par Anesthesia: Topical - 1 % Lidocaine/Phenylephrine\par Procedure:  Flexible Laryngoscopy with Stroboscopy - CPT 82243\par  \par Procedure Details:  \par The patient was placed in the sitting position.  After decongestant and anesthesia were applied the laryngoscope was passed.  The nasal cavities, nasopharynx, oropharynx, hypopharynx, and larynx were all examined.  Vocal folds were examined during respiration and phonation.  The following findings were noted:\par \par Findings:  \par Nose: Septum is midline, turbinates are normal, nasal airways patent, mucosa normal\par Nasopharynx: Adenoids normal, no masses, eustachian tube normal\par Oropharynx: Pharyngeal walls symmetric and without lesion. Tonsils/fossae symmetric\par Hypopharynx: Hypopharynx and pyriform sinuses without lesion. No masses or asymmetry.  No pooling of secretions.\par Larynx:  Epiglottis and aryepiglottic folds were sharp and crisp bilaterally.  Bilateral false vocal folds normal appearance. Airway was widely patent.\par \par Strobe Exam Ratings\par 		\par TVF Appearance: postoperative change to the right vocal fold with well-healing eschar, normal on left\par TVF Mobility: normal\par Edema/hypertrophy: normal\par Mucus on TVF: normal\par Glottic Closure: adequate\par Mucosal Wave:  slightly reduced on right\par Amplitude of Vibration: slightly reduced on right\par Phase: symmetric\par Supraglottic Hyperfunction: mild\par Other Findings:\par \par Condition: Stable.  Patient tolerated procedure well.\par \par Complications: None\par \par --------------------------------------------------------------------\par \par Procedure: Pharyngeal and speech evaluation, by cine or video - CPT 33588	\par Voice assessment was recorded via video recording on this date.\par \par Clarity:\par Range:\par Pitch Control:\par Projection:\par Tremor:\par Cough:\par \par Condition: Stable.  Patient tolerated procedure well.\par Complications: None\par

## 2023-04-20 NOTE — HISTORY OF PRESENT ILLNESS
[de-identified] : 2/27/23\par 77F presents with dysphonia. Pt unsure of when symptoms started. PCP told pt that they had noted a change in her voice. She has not noted any changes. Denies issues chewing, eating or swallowing. + dyspnea on exertion for about 3 years. She follows up with pulmonologist, doesn't recall having PFT's done. Voice demands include conversations throughout the day w/ friends, family. Nothing significant. No hx of acid reflux. Former smoker 1/2-1PPD x 20 years, quit 3 years ago. \par \par Diet:\par \par + 1 cup caffeine, chocolate, tomato, garlic, onion, carbonated beverages, spicy foods\par - mint, citrus, blueberries \par Glasses of water per day: 5 glasses \par Late night eating: no\par Alcohol use, especially at night: no\par -\par 4/10/23\par No change in symptoms, no new ear nose and throat symptoms.  Patient presents for preoperative evaluation.\par - [FreeTextEntry1] : 4/20/23\par POD #8 s/p suspension MicroDirect laryngoscopy with  right vocal fold excision and KTP laser ablation.  Overall patient stable and well.  She notes improvement in terms of her voice.  No issues chewing, eating, swallowing.  No breathing issues.  She denies any pain.  No new ear, nose, throat symptoms otherwise.

## 2023-06-12 ENCOUNTER — APPOINTMENT (OUTPATIENT)
Dept: OTOLARYNGOLOGY | Facility: CLINIC | Age: 78
End: 2023-06-12
Payer: MEDICARE

## 2023-06-12 VITALS — HEART RATE: 108 BPM | WEIGHT: 152 LBS | BODY MASS INDEX: 29.84 KG/M2 | TEMPERATURE: 97.7 F | HEIGHT: 60 IN

## 2023-06-12 PROCEDURE — 99215 OFFICE O/P EST HI 40 MIN: CPT | Mod: 25

## 2023-06-12 PROCEDURE — 31579 LARYNGOSCOPY TELESCOPIC: CPT

## 2023-06-12 NOTE — HISTORY OF PRESENT ILLNESS
[de-identified] : 2/27/23\par 77F presents with dysphonia. Pt unsure of when symptoms started. PCP told pt that they had noted a change in her voice. She has not noted any changes. Denies issues chewing, eating or swallowing. + dyspnea on exertion for about 3 years. She follows up with pulmonologist, doesn't recall having PFT's done. Voice demands include conversations throughout the day w/ friends, family. Nothing significant. No hx of acid reflux. Former smoker 1/2-1PPD x 20 years, quit 3 years ago. \par \par Diet:\par \par + 1 cup caffeine, chocolate, tomato, garlic, onion, carbonated beverages, spicy foods\par - mint, citrus, blueberries \par Glasses of water per day: 5 glasses \par Late night eating: no\par Alcohol use, especially at night: no\par -\par 4/10/23\par No change in symptoms, no new ear nose and throat symptoms.  Patient presents for preoperative evaluation.\par -\par 4/20/23\par POD #8 s/p suspension MicroDirect laryngoscopy with  right vocal fold excision and KTP laser ablation.  Overall patient stable and well.  She notes improvement in terms of her voice.  No issues chewing, eating, swallowing.  No breathing issues.  She denies any pain.  No new ear, nose, throat symptoms otherwise.\par - [FreeTextEntry1] : 6/12/23\par 2mo s/p suspension MicroDirect laryngoscopy with  right vocal fold excision and KTP laser ablation.  Overall patient stable and well.  She notes that her voice is more hoarse and raspy since the last visit. Feels like she has a "frog" in her throat. No issues chewing, eating, swallowing.  No breathing issues.  She denies any pain.  No new ear, nose, throat symptoms otherwise.

## 2023-06-12 NOTE — PROCEDURE
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 [address]\par  \par Procedure Details:  \par The patient was placed in the sitting position.  After decongestant and anesthesia were applied the laryngoscope was passed.  The nasal cavities, nasopharynx, oropharynx, hypopharynx, and larynx were all examined.  Vocal folds were examined during respiration and phonation.  The following findings were noted:\par \par Findings:  \par Nose: Septum is midline, turbinates are normal, nasal airways patent, mucosa normal\par Nasopharynx: Adenoids normal, no masses, eustachian tube normal\par Oropharynx: Pharyngeal walls symmetric and without lesion. Tonsils/fossae symmetric\par Hypopharynx: Hypopharynx and pyriform sinuses without lesion. No masses or asymmetry.  No pooling of secretions.\par Larynx:  Epiglottis and aryepiglottic folds were sharp and crisp bilaterally.  Bilateral false vocal folds normal appearance. Airway was widely patent.\par \par Strobe Exam Ratings\par 		\par TVF Appearance: parakeratosis on the right, normal on left \par TVF Mobility: normal\par Edema/hypertrophy: normal\par Mucus on TVF: normal\par Glottic Closure: normal/adequate\par Mucosal Wave: normal\par Amplitude of Vibration: normal\par Phase: symmetric\par Supraglottic Hyperfunction: none\par Other Findings: none\par \par Condition: Stable.  Patient tolerated procedure well.\par \par Complications: None\par \par --------------------------------------------------------------------\par

## 2023-06-12 NOTE — ASSESSMENT
[FreeTextEntry1] : 77 year old female presents with concern for dysphonia. On videostroboscopy, there is evidence of a white, patchy lesion of the right true vocal fold with subsequent glottic gap and impaired vibration. Based on her history and exam findings, I am recommending surgical intervention including right vocal fold lesion excisional biopsy with KTP laser ablation. \par \par 6/12/23: Patient is now 2mo s/p suspension MicroDirect laryngoscopy with excision of right vocal fold lesion.  Overall stable and well.  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  Patient is otherwise asymptomatic.  At this time I am recommending continued voice hygiene and voice rest when able.  Patient to follow-up every 2 months for surveillance.\par \par –Increase hydration\par – Voice hygiene\par – Follow-up every 2 months for surveillance

## 2023-06-12 NOTE — PHYSICAL EXAM
[Normal] : normal appearance, well groomed, well nourished, and in no acute distress [Laryngoscopy Performed] : laryngoscopy was performed, see procedure section for findings

## 2023-08-07 ENCOUNTER — APPOINTMENT (OUTPATIENT)
Dept: OTOLARYNGOLOGY | Facility: CLINIC | Age: 78
End: 2023-08-07
Payer: MEDICARE

## 2023-08-07 VITALS
BODY MASS INDEX: 29.84 KG/M2 | HEART RATE: 75 BPM | SYSTOLIC BLOOD PRESSURE: 132 MMHG | HEIGHT: 60 IN | WEIGHT: 152 LBS | DIASTOLIC BLOOD PRESSURE: 64 MMHG | TEMPERATURE: 97.7 F

## 2023-08-07 DIAGNOSIS — J38.3 OTHER DISEASES OF VOCAL CORDS: ICD-10-CM

## 2023-08-07 PROCEDURE — 31579 LARYNGOSCOPY TELESCOPIC: CPT

## 2023-08-07 PROCEDURE — 99215 OFFICE O/P EST HI 40 MIN: CPT | Mod: 25

## 2023-08-07 NOTE — PHYSICAL EXAM
[FreeTextEntry1] : Improved clarity with mild hoarseness, normal range, pitch control and projection [Normal] : mucosa is normal [Midline] : trachea located in midline position

## 2023-08-07 NOTE — ASSESSMENT
[FreeTextEntry1] : 77 year old female presents with concern for dysphonia. On videostroboscopy, there is evidence of a white, patchy lesion of the right true vocal fold with subsequent glottic gap and impaired vibration. Based on her history and exam findings, I am recommending surgical intervention including right vocal fold lesion excisional biopsy with KTP laser ablation.   8/7/23: Patient is now 4mo s/p suspension MicroDirect laryngoscopy with excision of right vocal fold lesion.  Overall stable and well.  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  Patient is otherwise asymptomatic with improved voice.  Previous exam findings of parakeratosis also improving.  At this time I am recommending continued voice hygiene and voice rest when able.  Patient to follow-up every 2 months for surveillance.  - Increase hydration - Voice hygiene - Follow-up every 2 months for surveillance

## 2023-08-07 NOTE — HISTORY OF PRESENT ILLNESS
[de-identified] : 2/27/23 77F presents with dysphonia. Pt unsure of when symptoms started. PCP told pt that they had noted a change in her voice. She has not noted any changes. Denies issues chewing, eating or swallowing. + dyspnea on exertion for about 3 years. She follows up with pulmonologist, doesn't recall having PFT's done. Voice demands include conversations throughout the day w/ friends, family. Nothing significant. No hx of acid reflux. Former smoker 1/2-1PPD x 20 years, quit 3 years ago.   Diet:  + 1 cup caffeine, chocolate, tomato, garlic, onion, carbonated beverages, spicy foods - mint, citrus, blueberries  Glasses of water per day: 5 glasses  Late night eating: no Alcohol use, especially at night: no - 4/10/23 No change in symptoms, no new ear nose and throat symptoms.  Patient presents for preoperative evaluation. - 4/20/23 POD #8 s/p suspension MicroDirect laryngoscopy with  right vocal fold excision and KTP laser ablation.  Overall patient stable and well.  She notes improvement in terms of her voice.  No issues chewing, eating, swallowing.  No breathing issues.  She denies any pain.  No new ear, nose, throat symptoms otherwise. - 6/12/23 2mo s/p suspension MicroDirect laryngoscopy with  right vocal fold excision and KTP laser ablation.  Overall patient stable and well.  She notes that her voice is more hoarse and raspy since the last visit. Feels like she has a "frog" in her throat. No issues chewing, eating, swallowing.  No breathing issues.  She denies any pain.  No new ear, nose, throat symptoms otherwise. - [FreeTextEntry1] : 8/7/23 4mo s/p suspension MicroDirect laryngoscopy with  right vocal fold excision and KTP laser ablation.  Overall patient stable and well.  She notes that her voice is very much improved.  She is happy with the results.  No issues chewing, eating, swallowing.  No breathing issues.  No fevers, night sweats or weight loss.

## 2023-08-07 NOTE — PROCEDURE
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  After decongestant and anesthesia were applied the laryngoscope was passed.  The nasal cavities, nasopharynx, oropharynx, hypopharynx, and larynx were all examined.  Vocal folds were examined during respiration and phonation.  The following findings were noted:  Findings:   Nose: Septum is midline, turbinates are normal, nasal airways patent, mucosa normal Nasopharynx: Adenoids normal, no masses, eustachian tube normal Oropharynx: Pharyngeal walls symmetric and without lesion. Tonsils/fossae symmetric Hypopharynx: Hypopharynx and pyriform sinuses without lesion. No masses or asymmetry.  No pooling of secretions. Larynx:  Epiglottis and aryepiglottic folds were sharp and crisp bilaterally.  Bilateral false vocal folds normal appearance. Airway was widely patent.  Strobe Exam Ratings 		 TVF Appearance: Improved parakeratosis on the right, normal on left  TVF Mobility: normal Edema/hypertrophy: normal Mucus on TVF: normal Glottic Closure: normal/adequate Mucosal Wave: normal Amplitude of Vibration: normal Phase: symmetric Supraglottic Hyperfunction: none Other Findings: none  Condition: Stable.  Patient tolerated procedure well.  Complications: None  --------------------------------------------------------------------

## 2023-10-02 ENCOUNTER — APPOINTMENT (OUTPATIENT)
Dept: OTOLARYNGOLOGY | Facility: CLINIC | Age: 78
End: 2023-10-02

## 2023-10-02 DIAGNOSIS — R49.0 DYSPHONIA: ICD-10-CM

## 2023-10-02 DIAGNOSIS — J38.3 OTHER DISEASES OF VOCAL CORDS: ICD-10-CM

## 2023-10-06 ENCOUNTER — APPOINTMENT (OUTPATIENT)
Dept: OTOLARYNGOLOGY | Facility: CLINIC | Age: 78
End: 2023-10-06

## 2024-09-04 NOTE — ASU DISCHARGE PLAN (ADULT/PEDIATRIC) - NS MD DC FALL RISK RISK
For information on Fall & Injury Prevention, visit: [email or web address] OR  [email or web address] OR  https://www.cdc.gov/steadi/patient.html [email or web address]

## (undated) DEVICE — SUT PROLENE 3-0 18" PS-1

## (undated) DEVICE — DRAPE SPLIT SHEET 77" X 108"

## (undated) DEVICE — SUCTION CATH ARGYLE WHISTLE TIP 14FR STRAIGHT PACKED

## (undated) DEVICE — Device

## (undated) DEVICE — CATH IV INTROCAN SAFETY 16G X 1.25" (GRAY) FEP

## (undated) DEVICE — SPECIMEN CONTAINER 4OZ

## (undated) DEVICE — SOL ANTI FOG

## (undated) DEVICE — DRAPE MAYO STAND 23"

## (undated) DEVICE — TUBING RAPIDVAC SMOKE EVACUATOR .25" X 10FT

## (undated) DEVICE — DRAPE TOWEL BLUE 17" X 24"

## (undated) DEVICE — GLV 7.5 PROTEXIS (WHITE)

## (undated) DEVICE — DRSG TELFA 3 X 8

## (undated) DEVICE — REUSABLE OROTRACHEAL LARYNGEAL INJECTOR NEEDLE

## (undated) DEVICE — SYR SLIP LOCK 1CC

## (undated) DEVICE — SYR LUER LOK 5CC

## (undated) DEVICE — SUT NDL MAYO CATGUT 1/2 CIRCLE TAPER POINT 0.050" X 0.897"